# Patient Record
(demographics unavailable — no encounter records)

---

## 2024-12-23 NOTE — REVIEW OF SYSTEMS
[Vision Problems] : vision problems [Incontinence] : incontinence [Joint Pain] : joint pain [Unsteady Walk] : ataxia [Memory Loss] : memory loss [Negative] : Heme/Lymph [Discharge] : no discharge [Pain] : no pain [Redness] : no redness [Dryness] : no dryness [Itching] : no itching [Abdominal Pain] : no abdominal pain [Nausea] : no nausea [Constipation] : no constipation [Diarrhea] : no diarrhea [Vomiting] : no vomiting [Heartburn] : no heartburn [Melena] : no melena [Dysuria] : no dysuria [Hesitancy] : no hesitancy [Nocturia] : no nocturia [Hematuria] : no hematuria [Frequency] : no frequency [Impotence] : no impotency [Poor Libido] : libido not poor [Joint Stiffness] : no joint stiffness [Muscle Pain] : no muscle pain [Muscle Weakness] : no muscle weakness [Back Pain] : no back pain [Joint Swelling] : no joint swelling [Headache] : no headache [Fainting] : no fainting [Confusion] : no confusion [FreeTextEntry7] : poor appetite [FreeTextEntry8] : stress incontinent  [de-identified] : history of dizziness

## 2024-12-23 NOTE — PHYSICAL EXAM
[No Acute Distress] : no acute distress [Normal Voice/Communication] : normal voice communication [Normal Sclera/Conjunctiva] : normal sclera/conjunctiva [Normal Outer Ear/Nose] : the ears and nose were normal in appearance [No JVD] : no jugular venous distention [No Respiratory Distress] : no respiratory distress [Normal Rate] : heart rate was normal  [Regular Rhythm] : with a regular rhythm [Normal S1, S2] : normal S1 and S2 [No Murmurs] : no murmurs heard [No Edema] : there was no peripheral edema [Normal Bowel Sounds] : normal bowel sounds [Non Tender] : non-tender [Soft] : abdomen soft [Not Distended] : not distended [Normal Post Cervical Nodes] : no posterior cervical lymphadenopathy [Normal Anterior Cervical Nodes] : no anterior cervical lymphadenopathy [No Joint Swelling] : no joint swelling seen [No Rash] : no rash [Normal Reflexes] : deep tendon reflexes were 2+ and symmetric [Oriented x3] : oriented to person, place, and time [Over the Past 2 Weeks, Have You Felt Down, Depressed, or Hopeless?] : 1.) Over the past 2 weeks, have you felt down, depressed, or hopeless? No [Over the Past 2 Weeks, Have You Felt Little Interest or Pleasure Doing Things?] : 2.) Over the past 2 weeks, have you felt little interest or pleasure doing things? No [Foot Ulcers] : no foot ulcers [de-identified] : unsteady gait,

## 2024-12-23 NOTE — PHYSICAL EXAM
[No Acute Distress] : no acute distress [Normal Voice/Communication] : normal voice communication [Normal Sclera/Conjunctiva] : normal sclera/conjunctiva [Normal Outer Ear/Nose] : the ears and nose were normal in appearance [No JVD] : no jugular venous distention [No Respiratory Distress] : no respiratory distress [Normal Rate] : heart rate was normal  [Regular Rhythm] : with a regular rhythm [Normal S1, S2] : normal S1 and S2 [No Murmurs] : no murmurs heard [No Edema] : there was no peripheral edema [Normal Bowel Sounds] : normal bowel sounds [Non Tender] : non-tender [Soft] : abdomen soft [Not Distended] : not distended [Normal Post Cervical Nodes] : no posterior cervical lymphadenopathy [Normal Anterior Cervical Nodes] : no anterior cervical lymphadenopathy [No Joint Swelling] : no joint swelling seen [No Rash] : no rash [Normal Reflexes] : deep tendon reflexes were 2+ and symmetric [Oriented x3] : oriented to person, place, and time [Over the Past 2 Weeks, Have You Felt Down, Depressed, or Hopeless?] : 1.) Over the past 2 weeks, have you felt down, depressed, or hopeless? No [Over the Past 2 Weeks, Have You Felt Little Interest or Pleasure Doing Things?] : 2.) Over the past 2 weeks, have you felt little interest or pleasure doing things? No [Foot Ulcers] : no foot ulcers [de-identified] : unsteady gait,

## 2024-12-23 NOTE — COUNSELING
[Normal Weight - ( BMI  <25 )] : normal weight - ( BMI  <25 ) [Sodium restriction 2gm recommended] : sodium restriction 2 gm recommended [Non - Smoker] : non-smoker [Smoke/CO Detectors] : smoke/CO detectors [Use grab bars] : use grab bars [Use assistive device to avoid falls] : use assistive device to avoid falls [Remove clutter and unsafe carpeting to avoid falls] : remove clutter and unsafe carpeting to avoid falls [] : diabetic screening [Patient not on disease-modifying anti-rheumatic drug due to overall prognosis] : Patient not on disease-modifying anti-rheumatic drug due to overall prognosis [Not Recommended] : Aspirin use not recommended due to overall prognosis [Minimize unnecessary interventions] : minimize unnecessary interventions [Maintain functional ability] : maintain functional ability [Discussed disease trajectory with patient/caregiver] : discussed disease trajectory with patient/caregiver [Patient/Caregiver not ready to engage in discussion] : patient/caregiver not ready to engage in discussion [Annual Discussion and review of: ___] : Annual discussion and review of [unfilled] [Full Code] : Code Status: Full Code [No Limitations] : Treatment Guidelines: No limitations [Long Term Intubation] : Intubation: Long term intubation [_____] : HCP: [unfilled] [ - New patient with 2 or more chronic conditions; CCM discussed and patient-centered care plan established] : New patient with 2 or more chronic conditions; CCM discussed and patient-centered care plan established [de-identified] : Daughter want DNR/DNi but will like to discuss with sisters

## 2024-12-23 NOTE — CHRONIC CARE ASSESSMENT
[Limited decision making ability] : limited decision making ability [___ Times Per Week] : exercises [unfilled] times per week [Walking] : walking [Low Fat Diet] : low fat [Low Carb Diet] : low carbohydrate [Low Salt Diet] : low salt [General Adherence] : and is generally adherent [PPS Score: ____] : Palliative Performance Scale (PPS) Score: [unfilled] [FAST Score: ____] : Functional Assessment Scale (FAST) Score: [unfilled] [de-identified] : Prostate cancer(2018) with s/p Radiation therapy,  brain hemorrhage(1/4/22), Craniotomy(1/5/22), early onset Alzheimer's(2018), memory loss, seizure disorder, unsteady gait, peripheral neuropathy,  headache, dizziness, [de-identified] : walks only at home, unsteady gait [FreeTextEntry9] : only go outside for appointment [de-identified] : low fat, low sodium

## 2024-12-23 NOTE — CHRONIC CARE ASSESSMENT
[Limited decision making ability] : limited decision making ability [___ Times Per Week] : exercises [unfilled] times per week [Walking] : walking [Low Fat Diet] : low fat [Low Carb Diet] : low carbohydrate [Low Salt Diet] : low salt [General Adherence] : and is generally adherent [PPS Score: ____] : Palliative Performance Scale (PPS) Score: [unfilled] [FAST Score: ____] : Functional Assessment Scale (FAST) Score: [unfilled] [de-identified] : Prostate cancer(2018) with s/p Radiation therapy,  brain hemorrhage(1/4/22), Craniotomy(1/5/22), early onset Alzheimer's(2018), memory loss, seizure disorder, unsteady gait, peripheral neuropathy,  headache, dizziness, [de-identified] : walks only at home, unsteady gait [FreeTextEntry9] : only go outside for appointment [de-identified] : low fat, low sodium

## 2024-12-23 NOTE — REVIEW OF SYSTEMS
[Vision Problems] : vision problems [Incontinence] : incontinence [Joint Pain] : joint pain [Unsteady Walk] : ataxia [Memory Loss] : memory loss [Negative] : Heme/Lymph [Discharge] : no discharge [Pain] : no pain [Redness] : no redness [Dryness] : no dryness [Itching] : no itching [Abdominal Pain] : no abdominal pain [Nausea] : no nausea [Constipation] : no constipation [Diarrhea] : no diarrhea [Vomiting] : no vomiting [Heartburn] : no heartburn [Melena] : no melena [Dysuria] : no dysuria [Hesitancy] : no hesitancy [Nocturia] : no nocturia [Hematuria] : no hematuria [Frequency] : no frequency [Impotence] : no impotency [Poor Libido] : libido not poor [Joint Stiffness] : no joint stiffness [Muscle Pain] : no muscle pain [Muscle Weakness] : no muscle weakness [Back Pain] : no back pain [Joint Swelling] : no joint swelling [Headache] : no headache [Fainting] : no fainting [Confusion] : no confusion [FreeTextEntry7] : poor appetite [FreeTextEntry8] : stress incontinent  [de-identified] : history of dizziness

## 2024-12-23 NOTE — COUNSELING
[Normal Weight - ( BMI  <25 )] : normal weight - ( BMI  <25 ) [Sodium restriction 2gm recommended] : sodium restriction 2 gm recommended [Non - Smoker] : non-smoker [Smoke/CO Detectors] : smoke/CO detectors [Use grab bars] : use grab bars [Use assistive device to avoid falls] : use assistive device to avoid falls [Remove clutter and unsafe carpeting to avoid falls] : remove clutter and unsafe carpeting to avoid falls [] : diabetic screening [Patient not on disease-modifying anti-rheumatic drug due to overall prognosis] : Patient not on disease-modifying anti-rheumatic drug due to overall prognosis [Not Recommended] : Aspirin use not recommended due to overall prognosis [Minimize unnecessary interventions] : minimize unnecessary interventions [Maintain functional ability] : maintain functional ability [Discussed disease trajectory with patient/caregiver] : discussed disease trajectory with patient/caregiver [Patient/Caregiver not ready to engage in discussion] : patient/caregiver not ready to engage in discussion [Annual Discussion and review of: ___] : Annual discussion and review of [unfilled] [Full Code] : Code Status: Full Code [No Limitations] : Treatment Guidelines: No limitations [Long Term Intubation] : Intubation: Long term intubation [_____] : HCP: [unfilled] [ - New patient with 2 or more chronic conditions; CCM discussed and patient-centered care plan established] : New patient with 2 or more chronic conditions; CCM discussed and patient-centered care plan established [de-identified] : Daughter want DNR/DNi but will like to discuss with sisters

## 2024-12-23 NOTE — HISTORY OF PRESENT ILLNESS
[In-Place] : has aide services in-place [Patient is stable] : patient is stable [Education provided] : education provided [Patient] : patient [Family Member] : family member [FreeTextEntry4] : urology, cardiology, neurology, podiatrist  [FreeTextEntry1] : Unsteady gait, Alzheimer's disease, memory loss [FreeTextEntry2] : 12/20/2024 COVID SCREEN: Patient or caretaker denies fever, cough, trouble breathing, rash, vomiting. Patient has not been in close contact with anyone who is COVID-19 positive, or suspected of having COVID-19.  N95 mask, gloves, eye wear and gown (if indicated) used during visit: Yes.  Total face to face time with patient is 70 min.  Fredy Ferreira is an 82M with a PMHx of Prostate cancer(2018) with s/p Radiation therapy, HTN, HLD, brain hemorrhage(1/4/22), Craniotomy(1/5/22), early onset Alzheimer's(2018), memory loss, seizure disorder, cataract surgery, unsteady gait, peripheral neuropathy, GERD, headache, dizziness, seen today for initial home visit  Accompanied patient for the visit was spouse, daughter and HHA  Visit done with daughter on the phone.  Hospitalizations: August 2024: ER visit as HHA suspected seizure like activity. F/u with Neurologist revealed EEG to be unremarkable.   Today's Visit and Review - 12/20/2024 - Daughter on phone for visit from start of visit but was inpatient at home few minutes later. Daughter expresses dissatisfaction with current PCP. States the provider did not inform the family of the pt's prostate cancer. - Daughter reports recent labs show PSA 8. - Ambulates at home with walker, uses wheelchair outside of home. - Reports neuropathy pain in hands and feet managed by Gabapentin. - R-sided weakness and possible visual impairment attributed to past brain injury. - Difficulty with fluid intake, prefers coconut water over plain water. - c/o headaches and occasional dizziness. - Most recent fall 2022 - Request for Flu shot  - Receiving 24-hour care from two [facility]"" (one [facility] 3 days/week, other 4 days/week).   Geriatric Assessment: -Appetite/weight: decreased, worse at dinner -Gait/falls: No recent falls, ambulates with walker around home -Pain: neuropathy and controlled on gabapentin -Sleep: sleeps well -BMs: Regular, no constipation/diarrhea; continent. -Urine: normal, no pain, blood, or odor; stress incontinent -Skin: Intact -DME: walker, wheelchair -Mood/memory: memory loss Communication: normal  PSHx: Cataract b/l Craniotomy (2022)  Social Hx: Former smoker (stopped in 1980's) Wife still alive 3 female children retired  Family Hx: HTN: Mother Seizure-Brother  Specialists: Urology, neurology, cardiology  Allergies: Lactose intolerant Advised against aspirin due to PMHx of brain hemorrhage   Patient/ patient's caregiver reports no weight loss >10 lbs in the past 6 months. No changes in dentition or swallowing reported, No changes in hearing or vision reported. No changes in Cognition reported but with early unset Alzheimer's and memory loss. Patient denies any symptoms of depression or anxiety. Patient is ADL independent/dependent and IADL dependent. No changes in gait or falls reported.  Patient's home environment is safe.   Goals of care discussed. MOLST not completed.  . .

## 2024-12-23 NOTE — PHYSICAL EXAM
[No Acute Distress] : no acute distress [Normal Voice/Communication] : normal voice communication [Normal Sclera/Conjunctiva] : normal sclera/conjunctiva [Normal Outer Ear/Nose] : the ears and nose were normal in appearance [No JVD] : no jugular venous distention [No Respiratory Distress] : no respiratory distress [Normal Rate] : heart rate was normal  [Regular Rhythm] : with a regular rhythm [Normal S1, S2] : normal S1 and S2 [No Murmurs] : no murmurs heard [No Edema] : there was no peripheral edema [Normal Bowel Sounds] : normal bowel sounds [Non Tender] : non-tender [Soft] : abdomen soft [Not Distended] : not distended [Normal Post Cervical Nodes] : no posterior cervical lymphadenopathy [Normal Anterior Cervical Nodes] : no anterior cervical lymphadenopathy [No Joint Swelling] : no joint swelling seen [No Rash] : no rash [Normal Reflexes] : deep tendon reflexes were 2+ and symmetric [Oriented x3] : oriented to person, place, and time [Over the Past 2 Weeks, Have You Felt Down, Depressed, or Hopeless?] : 1.) Over the past 2 weeks, have you felt down, depressed, or hopeless? No [Over the Past 2 Weeks, Have You Felt Little Interest or Pleasure Doing Things?] : 2.) Over the past 2 weeks, have you felt little interest or pleasure doing things? No [Foot Ulcers] : no foot ulcers [de-identified] : unsteady gait,

## 2024-12-23 NOTE — HEALTH RISK ASSESSMENT
[HRA Reviewed] : Health risk assessment reviewed [Independent] : feeding [Some assistance needed] : managing finances [Full assistance needed] : doing laundry [No falls in past year] : Patient reported no falls in the past year [Yes] : The patient does have visual impairment [TimeGetUpGo] : 6 [de-identified] : unsteady gait and use walker at home

## 2024-12-23 NOTE — REASON FOR VISIT
[Initial Evaluation] : an initial evaluation [Spouse] : spouse [Family Member] : family member [Formal Caregiver] : formal caregiver [Other: ______] : provided by RAY [Time Spent: ____ minutes] : Total time spent using  services: [unfilled] minutes. The patient's primary language is not English thus required  services. [Interpreters_FullName] : Yessenia Garrison [Interpreters_Relationshiptopatient] : Daughter [FreeTextEntry3] : neurology, urology, cardiology [TWNoteComboBox1] : Austrian

## 2024-12-23 NOTE — HISTORY OF PRESENT ILLNESS
[In-Place] : has aide services in-place [Patient is stable] : patient is stable [Education provided] : education provided [Patient] : patient [Family Member] : family member [FreeTextEntry4] : urology, cardiology, neurology, podiatrist  [FreeTextEntry1] : Unsteady gait, Alzheimer's disease, memory loss [FreeTextEntry2] : 12/20/2024 COVID SCREEN: Patient or caretaker denies fever, cough, trouble breathing, rash, vomiting. Patient has not been in close contact with anyone who is COVID-19 positive, or suspected of having COVID-19.  N95 mask, gloves, eye wear and gown (if indicated) used during visit: Yes.  Total face to face time with patient is 70 min.  Fredy Ferreira is an 82M with a PMHx of Prostate cancer(2018) with s/p Radiation therapy, HTN, HLD, brain hemorrhage(1/4/22), Craniotomy(1/5/22), early onset Alzheimer's(2018), memory loss, seizure disorder, cataract surgery, unsteady gait, peripheral neuropathy, GERD, headache, dizziness, seen today for initial home visit  Accompanied patient for the visit was spouse, daughter and HHA  Visit done with daughter on the phone.  Hospitalizations: August 2024: ER visit as HHA suspected seizure like activity. F/u with Neurologist revealed EEG to be unremarkable.   Today's Visit and Review - 12/20/2024 - Daughter on phone for visit from start of visit but was inpatient at home few minutes later. Daughter expresses dissatisfaction with current PCP. States the provider did not inform the family of the pt's prostate cancer. - Daughter reports recent labs show PSA 8. - Ambulates at home with walker, uses wheelchair outside of home. - Reports neuropathy pain in hands and feet managed by Gabapentin. - R-sided weakness and possible visual impairment attributed to past brain injury. - Difficulty with fluid intake, prefers coconut water over plain water. - c/o headaches and occasional dizziness. - Most recent fall 2022 - Request for Flu shot  - Receiving 24-hour care from two [facility] (one [facility] 3 days/week, other 4 days/week).   Geriatric Assessment: -Appetite/weight: decreased, worse at dinner -Gait/falls: No recent falls, ambulates with walker around home -Pain: neuropathy and controlled on gabapentin -Sleep: sleeps well -BMs: Regular, no constipation/diarrhea; continent. -Urine: normal, no pain, blood, or odor; stress incontinent -Skin: Intact -DME: walker, wheelchair -Mood/memory: memory loss Communication: normal  PSHx: Cataract b/l Craniotomy (2022)  Social Hx: Former smoker (stopped in 1980's) Wife still alive 3 female children retired  Family Hx: HTN: Mother Seizure-Brother  Specialists: Urology, neurology, cardiology  Allergies: Lactose intolerant Advised against aspirin due to PMHx of brain hemorrhage   Patient/ patient's caregiver reports no weight loss >10 lbs in the past 6 months. No changes in dentition or swallowing reported, No changes in hearing or vision reported. No changes in Cognition reported but with early unset Alzheimer's and memory loss. Patient denies any symptoms of depression or anxiety. Patient is ADL independent/dependent and IADL dependent. No changes in gait or falls reported.  Patient's home environment is safe.   Goals of care discussed. MOLST not completed.  . .

## 2024-12-23 NOTE — HEALTH RISK ASSESSMENT
[HRA Reviewed] : Health risk assessment reviewed [Independent] : feeding [Some assistance needed] : managing finances [Full assistance needed] : doing laundry [No falls in past year] : Patient reported no falls in the past year [Yes] : The patient does have visual impairment [TimeGetUpGo] : 6 [de-identified] : unsteady gait and use walker at home

## 2024-12-23 NOTE — REASON FOR VISIT
[Initial Evaluation] : an initial evaluation [Spouse] : spouse [Family Member] : family member [Formal Caregiver] : formal caregiver [Other: ______] : provided by RAY [Time Spent: ____ minutes] : Total time spent using  services: [unfilled] minutes. The patient's primary language is not English thus required  services. [Interpreters_FullName] : Yessenia Garrison [Interpreters_Relationshiptopatient] : Daughter [FreeTextEntry3] : neurology, urology, cardiology [TWNoteComboBox1] : Guyanese

## 2024-12-23 NOTE — COUNSELING
[Normal Weight - ( BMI  <25 )] : normal weight - ( BMI  <25 ) [Sodium restriction 2gm recommended] : sodium restriction 2 gm recommended [Non - Smoker] : non-smoker [Smoke/CO Detectors] : smoke/CO detectors [Use grab bars] : use grab bars [Use assistive device to avoid falls] : use assistive device to avoid falls [Remove clutter and unsafe carpeting to avoid falls] : remove clutter and unsafe carpeting to avoid falls [] : diabetic screening [Patient not on disease-modifying anti-rheumatic drug due to overall prognosis] : Patient not on disease-modifying anti-rheumatic drug due to overall prognosis [Not Recommended] : Aspirin use not recommended due to overall prognosis [Minimize unnecessary interventions] : minimize unnecessary interventions [Maintain functional ability] : maintain functional ability [Discussed disease trajectory with patient/caregiver] : discussed disease trajectory with patient/caregiver [Patient/Caregiver not ready to engage in discussion] : patient/caregiver not ready to engage in discussion [Annual Discussion and review of: ___] : Annual discussion and review of [unfilled] [Full Code] : Code Status: Full Code [No Limitations] : Treatment Guidelines: No limitations [Long Term Intubation] : Intubation: Long term intubation [_____] : HCP: [unfilled] [ - New patient with 2 or more chronic conditions; CCM discussed and patient-centered care plan established] : New patient with 2 or more chronic conditions; CCM discussed and patient-centered care plan established [de-identified] : Daughter want DNR/DNi but will like to discuss with sisters

## 2024-12-23 NOTE — REVIEW OF SYSTEMS
TRANSFER - IN REPORT:    Verbal report received from [address], RN(name) on [facility]   being received from CCU (unit) for routine progression of care      Report consisted of patients Situation, Background, Assessment and   Recommendations(SBAR). Information from the following report(s) SBAR, Kardex, Intake/Output and MAR was reviewed with the receiving nurse. Opportunity for questions and clarification was provided. Assessment completed upon patients arrival to unit and care assumed. [Vision Problems] : vision problems [Incontinence] : incontinence [Joint Pain] : joint pain [Unsteady Walk] : ataxia [Memory Loss] : memory loss [Negative] : Heme/Lymph [Discharge] : no discharge [Pain] : no pain [Redness] : no redness [Dryness] : no dryness [Itching] : no itching [Abdominal Pain] : no abdominal pain [Nausea] : no nausea [Constipation] : no constipation [Diarrhea] : no diarrhea [Vomiting] : no vomiting [Heartburn] : no heartburn [Melena] : no melena [Dysuria] : no dysuria [Hesitancy] : no hesitancy [Nocturia] : no nocturia [Hematuria] : no hematuria [Frequency] : no frequency [Impotence] : no impotency [Poor Libido] : libido not poor [Joint Stiffness] : no joint stiffness [Muscle Pain] : no muscle pain [Muscle Weakness] : no muscle weakness [Back Pain] : no back pain [Joint Swelling] : no joint swelling [Headache] : no headache [Fainting] : no fainting [Confusion] : no confusion [FreeTextEntry7] : poor appetite [FreeTextEntry8] : stress incontinent  [de-identified] : history of dizziness

## 2024-12-23 NOTE — ADDENDUM
[FreeTextEntry1] : Documented by Ethel Moreno acting as a scribe under Lexis Thornton NP. 12/20/2024

## 2024-12-23 NOTE — HISTORY OF PRESENT ILLNESS
[In-Place] : has aide services in-place [Patient is stable] : patient is stable [Education provided] : education provided [Patient] : patient [Family Member] : family member [FreeTextEntry4] : urology, cardiology, neurology, podiatrist  [FreeTextEntry1] : Unsteady gait, Alzheimer's disease, memory loss [FreeTextEntry2] : 12/20/2024 COVID SCREEN: Patient or caretaker denies fever, cough, trouble breathing, rash, vomiting. Patient has not been in close contact with anyone who is COVID-19 positive, or suspected of having COVID-19.  N95 mask, gloves, eye wear and gown (if indicated) used during visit: Yes.  Total face to face time with patient is 70 min.  Fredy Ferreira is an 82M with a PMHx of Prostate cancer(2018) with s/p Radiation therapy, HTN, HLD, brain hemorrhage(1/4/22), Craniotomy(1/5/22), early onset Alzheimer's(2018), memory loss, seizure disorder, cataract surgery, unsteady gait, peripheral neuropathy, GERD, headache, dizziness, seen today for initial home visit  Accompanied patient for the visit was spouse, daughter and HHA  Visit done with daughter on the phone.  Hospitalizations: August 2024: ER visit as HHA suspected seizure like activity. F/u with Neurologist revealed EEG to be unremarkable.   Today's Visit and Review - 12/20/2024 - Daughter on phone for visit from start of visit but was inpatient at home few minutes later. Daughter expresses dissatisfaction with current PCP. States the provider did not inform the family of the pt's prostate cancer. - Daughter reports recent labs show PSA 8. - Ambulates at home with walker, uses wheelchair outside of home. - Reports neuropathy pain in hands and feet managed by Gabapentin. - R-sided weakness and possible visual impairment attributed to past brain injury. - Difficulty with fluid intake, prefers coconut water over plain water. - c/o headaches and occasional dizziness. - Most recent fall 2022 - Request for Flu shot  - Receiving 24-hour care from two [facility] (one [facility] 3 days/week, other 4 days/week).   Geriatric Assessment: -Appetite/weight: decreased, worse at dinner -Gait/falls: No recent falls, ambulates with walker around home -Pain: neuropathy and controlled on gabapentin -Sleep: sleeps well -BMs: Regular, no constipation/diarrhea; continent. -Urine: normal, no pain, blood, or odor; stress incontinent -Skin: Intact -DME: walker, wheelchair -Mood/memory: memory loss Communication: normal  PSHx: Cataract b/l Craniotomy (2022)  Social Hx: Former smoker (stopped in 1980's) Wife still alive 3 female children retired  Family Hx: HTN: Mother Seizure-Brother  Specialists: Urology, neurology, cardiology  Allergies: Lactose intolerant Advised against aspirin due to PMHx of brain hemorrhage   Patient/ patient's caregiver reports no weight loss >10 lbs in the past 6 months. No changes in dentition or swallowing reported, No changes in hearing or vision reported. No changes in Cognition reported but with early unset Alzheimer's and memory loss. Patient denies any symptoms of depression or anxiety. Patient is ADL independent/dependent and IADL dependent. No changes in gait or falls reported.  Patient's home environment is safe.   Goals of care discussed. MOLST not completed.  . .

## 2024-12-23 NOTE — HEALTH RISK ASSESSMENT
[HRA Reviewed] : Health risk assessment reviewed [Independent] : feeding [Some assistance needed] : managing finances [Full assistance needed] : doing laundry [No falls in past year] : Patient reported no falls in the past year [Yes] : The patient does have visual impairment [TimeGetUpGo] : 6 [de-identified] : unsteady gait and use walker at home

## 2024-12-23 NOTE — REASON FOR VISIT
[Initial Evaluation] : an initial evaluation [Spouse] : spouse [Family Member] : family member [Formal Caregiver] : formal caregiver [Other: ______] : provided by RAY [Time Spent: ____ minutes] : Total time spent using  services: [unfilled] minutes. The patient's primary language is not English thus required  services. [Interpreters_FullName] : Yessenia Garrison [Interpreters_Relationshiptopatient] : Daughter [FreeTextEntry3] : neurology, urology, cardiology [TWNoteComboBox1] : Iranian

## 2024-12-23 NOTE — CHRONIC CARE ASSESSMENT
[Limited decision making ability] : limited decision making ability [___ Times Per Week] : exercises [unfilled] times per week [Walking] : walking [Low Fat Diet] : low fat [Low Carb Diet] : low carbohydrate [Low Salt Diet] : low salt [General Adherence] : and is generally adherent [PPS Score: ____] : Palliative Performance Scale (PPS) Score: [unfilled] [FAST Score: ____] : Functional Assessment Scale (FAST) Score: [unfilled] [de-identified] : Prostate cancer(2018) with s/p Radiation therapy,  brain hemorrhage(1/4/22), Craniotomy(1/5/22), early onset Alzheimer's(2018), memory loss, seizure disorder, unsteady gait, peripheral neuropathy,  headache, dizziness, [FreeTextEntry9] : only go outside for appointment [de-identified] : walks only at home, unsteady gait [de-identified] : low fat, low sodium

## 2024-12-23 NOTE — END OF VISIT
[FreeTextEntry3] : Documented by Ethel Moreno acting as a scribe for Lexis Thornton NP. 12/20/2024   All medical record entries made by the Scribe were at my direction and personally dictated by me on 12/20/2024. I have reviewed the chart and agree that the record accurately reflects my personal performance of the history, physical exam, assessment and plan. I have also personally directed, reviewed, and agreed with the chart.

## 2025-03-06 NOTE — REASON FOR VISIT
[Initial Evaluation] : an initial evaluation [Other: ______] : provided by RAY [Follow-Up] : a follow-up visit [Spouse] : spouse [Family Member] : family member [Formal Caregiver] : formal caregiver [Other: _____] : [unfilled] [Time Spent: ____ minutes] : Total time spent using  services: [unfilled] minutes. The patient's primary language is not English thus required  services. [Pre-Visit Preparation] : pre-visit preparation was done [Intercurrent Specialty/Sub-specialty Visits] : the patient has intercurrent specialty/sub-specialty visits [Interpreters_FullName] : Yessenia Garrison [Interpreters_Relationshiptopatient] : Daughter [FreeTextEntry2] : chart reviewed [FreeTextEntry3] : neurology, urology, cardiology [TWNoteComboBox1] : Mexican

## 2025-03-06 NOTE — ADDENDUM
[FreeTextEntry1] :   Documented by Brian Jackson acting as a scribe for Lexis Thornton NP. 03/06/2025

## 2025-03-06 NOTE — COUNSELING
[] : diabetic screening [Not Recommended] : Aspirin use not recommended due to overall prognosis [Discussed disease trajectory with patient/caregiver] : discussed disease trajectory with patient/caregiver [Patient/Caregiver not ready to engage in discussion] : patient/caregiver not ready to engage in discussion [Full Code] : Code Status: Full Code [No Limitations] : Treatment Guidelines: No limitations [Long Term Intubation] : Intubation: Long term intubation [Normal Weight - ( BMI  <25 )] : normal weight - ( BMI  <25 ) [Sodium restriction 2gm recommended] : sodium restriction 2 gm recommended [Non - Smoker] : non-smoker [Smoke/CO Detectors] : smoke/CO detectors [Use grab bars] : use grab bars [Use assistive device to avoid falls] : use assistive device to avoid falls [Remove clutter and unsafe carpeting to avoid falls] : remove clutter and unsafe carpeting to avoid falls [Patient not on disease-modifying anti-rheumatic drug due to overall prognosis] : Patient not on disease-modifying anti-rheumatic drug due to overall prognosis [Minimize unnecessary interventions] : minimize unnecessary interventions [Maintain functional ability] : maintain functional ability [Advanced Directives discussed: ____] : Advanced directives discussed: [unfilled] [Annual Discussion and review of: ___] : Annual discussion and review of [unfilled] [_____] : HCP: [unfilled] [de-identified] : Daughter want DNR/DNi but will like to discuss with sisters

## 2025-03-06 NOTE — REVIEW OF SYSTEMS
[Vision Problems] : vision problems [Incontinence] : incontinence [Joint Pain] : joint pain [Joint Stiffness] : joint stiffness [Back Pain] : back pain [Confusion] : confusion [Unsteady Walk] : ataxia [Memory Loss] : memory loss [Negative] : Heme/Lymph [Discharge] : no discharge [Pain] : no pain [Redness] : no redness [Dryness] : no dryness [Itching] : no itching [Abdominal Pain] : no abdominal pain [Nausea] : no nausea [Constipation] : no constipation [Diarrhea] : no diarrhea [Vomiting] : no vomiting [Heartburn] : no heartburn [Melena] : no melena [Dysuria] : no dysuria [Hesitancy] : no hesitancy [Nocturia] : no nocturia [Hematuria] : no hematuria [Frequency] : no frequency [Impotence] : no impotency [Poor Libido] : libido not poor [Muscle Pain] : no muscle pain [Muscle Weakness] : no muscle weakness [Joint Swelling] : no joint swelling [Headache] : no headache [Dizziness] : no dizziness [Fainting] : no fainting [FreeTextEntry3] : right eye poor vision  [FreeTextEntry7] : decrease  appetite [FreeTextEntry8] : stress incontinent  [de-identified] : s

## 2025-03-06 NOTE — REASON FOR VISIT
[Initial Evaluation] : an initial evaluation [Other: ______] : provided by RAY [Follow-Up] : a follow-up visit [Spouse] : spouse [Family Member] : family member [Formal Caregiver] : formal caregiver [Other: _____] : [unfilled] [Time Spent: ____ minutes] : Total time spent using  services: [unfilled] minutes. The patient's primary language is not English thus required  services. [Pre-Visit Preparation] : pre-visit preparation was done [Intercurrent Specialty/Sub-specialty Visits] : the patient has intercurrent specialty/sub-specialty visits [Interpreters_FullName] : Yessenia Garrison [Interpreters_Relationshiptopatient] : Daughter [FreeTextEntry2] : chart reviewed [FreeTextEntry3] : neurology, urology, cardiology [TWNoteComboBox1] : Iraqi

## 2025-03-06 NOTE — REVIEW OF SYSTEMS
[Vision Problems] : vision problems [Incontinence] : incontinence [Joint Pain] : joint pain [Joint Stiffness] : joint stiffness [Back Pain] : back pain [Confusion] : confusion [Unsteady Walk] : ataxia [Memory Loss] : memory loss [Negative] : Heme/Lymph [Discharge] : no discharge [Pain] : no pain [Redness] : no redness [Dryness] : no dryness [Itching] : no itching [Abdominal Pain] : no abdominal pain [Nausea] : no nausea [Constipation] : no constipation [Diarrhea] : no diarrhea [Vomiting] : no vomiting [Heartburn] : no heartburn [Melena] : no melena [Dysuria] : no dysuria [Hesitancy] : no hesitancy [Nocturia] : no nocturia [Hematuria] : no hematuria [Frequency] : no frequency [Impotence] : no impotency [Poor Libido] : libido not poor [Muscle Pain] : no muscle pain [Muscle Weakness] : no muscle weakness [Joint Swelling] : no joint swelling [Headache] : no headache [Dizziness] : no dizziness [Fainting] : no fainting [FreeTextEntry3] : right eye poor vision  [FreeTextEntry7] : decrease  appetite [FreeTextEntry8] : stress incontinent  [de-identified] : s

## 2025-03-06 NOTE — PHYSICAL EXAM
[No Rash] : no rash [Normal Reflexes] : deep tendon reflexes were 2+ and symmetric [No Acute Distress] : no acute distress [Normal Voice/Communication] : normal voice communication [Normal Sclera/Conjunctiva] : normal sclera/conjunctiva [Normal Outer Ear/Nose] : the ears and nose were normal in appearance [Normal TMs] : both tympanic membranes were normal [No JVD] : no jugular venous distention [No Respiratory Distress] : no respiratory distress [Normal Rate] : heart rate was normal  [Regular Rhythm] : with a regular rhythm [Normal S1, S2] : normal S1 and S2 [No Murmurs] : no murmurs heard [No Edema] : there was no peripheral edema [Normal Bowel Sounds] : normal bowel sounds [Non Tender] : non-tender [Soft] : abdomen soft [Not Distended] : not distended [Normal Post Cervical Nodes] : no posterior cervical lymphadenopathy [Normal Anterior Cervical Nodes] : no anterior cervical lymphadenopathy [No Joint Swelling] : no joint swelling seen [Oriented x3] : oriented to person, place, and time [Over the Past 2 Weeks, Have You Felt Down, Depressed, or Hopeless?] : 1.) Over the past 2 weeks, have you felt down, depressed, or hopeless? No [Over the Past 2 Weeks, Have You Felt Little Interest or Pleasure Doing Things?] : 2.) Over the past 2 weeks, have you felt little interest or pleasure doing things? No [Foot Ulcers] : no foot ulcers [de-identified] : unsteady gait, [de-identified] : right shoulder uneven

## 2025-03-06 NOTE — HEALTH RISK ASSESSMENT
[Independent] : feeding [Some assistance needed] : managing finances [Full assistance needed] : doing laundry [No falls in past year] : Patient reported no falls in the past year [Yes] : The patient does have visual impairment [HRA Reviewed] : Health risk assessment reviewed [TimeGetUpGo] : 9 [de-identified] : unsteady gait and use walker at home

## 2025-03-06 NOTE — PHYSICAL EXAM
[No Rash] : no rash [Normal Reflexes] : deep tendon reflexes were 2+ and symmetric [No Acute Distress] : no acute distress [Normal Voice/Communication] : normal voice communication [Normal Sclera/Conjunctiva] : normal sclera/conjunctiva [Normal Outer Ear/Nose] : the ears and nose were normal in appearance [Normal TMs] : both tympanic membranes were normal [No JVD] : no jugular venous distention [No Respiratory Distress] : no respiratory distress [Normal Rate] : heart rate was normal  [Regular Rhythm] : with a regular rhythm [Normal S1, S2] : normal S1 and S2 [No Murmurs] : no murmurs heard [No Edema] : there was no peripheral edema [Normal Bowel Sounds] : normal bowel sounds [Non Tender] : non-tender [Soft] : abdomen soft [Not Distended] : not distended [Normal Post Cervical Nodes] : no posterior cervical lymphadenopathy [Normal Anterior Cervical Nodes] : no anterior cervical lymphadenopathy [No Joint Swelling] : no joint swelling seen [Oriented x3] : oriented to person, place, and time [Over the Past 2 Weeks, Have You Felt Down, Depressed, or Hopeless?] : 1.) Over the past 2 weeks, have you felt down, depressed, or hopeless? No [Over the Past 2 Weeks, Have You Felt Little Interest or Pleasure Doing Things?] : 2.) Over the past 2 weeks, have you felt little interest or pleasure doing things? No [Foot Ulcers] : no foot ulcers [de-identified] : unsteady gait, [de-identified] : right shoulder uneven

## 2025-03-06 NOTE — HISTORY OF PRESENT ILLNESS
[In-Place] : has aide services in-place [Patient is stable] : patient is stable [Education provided] : education provided [Patient] : patient [Family Member] : family member [FreeTextEntry4] : urology, cardiology, neurology, podiatrist  [FreeTextEntry1] : Unsteady gait, Alzheimer's disease, memory loss [FreeTextEntry2] : 03/06/2025 COVID SCREEN: Patient or caretaker denies fever, cough, trouble breathing, rash, vomiting. Patient has not been in close contact with anyone who is COVID-19 positive, or suspected of having COVID-19.  N95 mask, gloves, eye wear and gown (if indicated) used during visit: Yes.  Total face to face time with patient is 45min.  Fredy Ferreira is an 82M with a PMHx of Prostate cancer(2018) with s/p Radiation therapy, HTN, HLD, brain hemorrhage(1/4/22), Craniotomy(1/5/22), early onset Alzheimer's(2018), memory loss, seizure disorder, cataract surgery, unsteady gait, peripheral neuropathy, GERD, headache, dizziness, seen today for follow up home visit  Accompanied patient for the visit was spouse, daughter and HHA   Hospitalizations: August 2024: ER visit as HHA suspected seizure like activity. F/u with Neurologist revealed EEG to be unremarkable.   Daughter was primary historian  and    Today's Visit and Review - 03/06/2025 - Reports has daily headaches, seeing neurologist this afternoon. - Is in beginning stages of Alzheimer's, frequently remarks that he can feel his memory slipping. Still remembers loved ones. - Discussed metazepam for appetite boost. Patient is receiving multivitamin but daughter ran out last month. - Reports eats oatmeal for breakfast, reports feels full longer since starting eating that. Unable to have Ensure due to severe lactose intolerance. - Reports tends to sit all day and watch TV, reports he has recently started covering his head with a blanket throughout the day. - Requests occasional dizziness in mornings. Revisited risks and side effects of blood pressure medication, as well as proper procedure for giving medication. Advised checking blood pressure, and then only give blood pressure if SBP is over 110. - Admits patient refuses to drink water. Will drink Pedialyte and coconut water. - Reports back pain but admits slouches when sitting on couch. Also reports right shoulder is jutting forward. Orthopedic referral sent for evaluation of shoulder. Will get right shoulder Xray - Reports tends to make mess when eating. Tested vision in right eye which was normal. - Oncology reported abnormal TSH with recent blood work  Geriatric Assessment: -Appetite/weight: overall decreased at dinner time -Gait/falls: No recent falls, ambulates with walker around home -Pain: neuropathy and controlled on gabapentin -Sleep: sleeps well -BMs: Regular, no constipation/diarrhea; continent. -Urine: normal, no pain, blood, or odor; stress incontinent -Skin: Intact -DME: walker, wheelchair -Mood/memory: memory loss, depressed Communication: normal  Patient's caregiver reports no weight loss >10 lbs in the past 6 months. No changes in dentition or swallowing reported, No changes in hearing or vision reported. No changes in Cognition reported but with early unset Alzheimer's and memory loss. Patient denies any symptoms of depression or anxiety. Patient is ADL independent/dependent and IADL dependent.  Patient's home environment is safe.   Goals of care discussed 20. MOLST not completed.

## 2025-03-06 NOTE — HEALTH RISK ASSESSMENT
[Independent] : feeding [Some assistance needed] : managing finances [Full assistance needed] : doing laundry [No falls in past year] : Patient reported no falls in the past year [Yes] : The patient does have visual impairment [HRA Reviewed] : Health risk assessment reviewed [TimeGetUpGo] : 9 [de-identified] : unsteady gait and use walker at home

## 2025-03-06 NOTE — CHRONIC CARE ASSESSMENT
[___ Times Per Week] : exercises [unfilled] times per week [Low Fat Diet] : low fat [General Adherence] : and is generally adherent [Limited decision making ability] : limited decision making ability [Walking] : walking [Low Carb Diet] : low carbohydrate [Low Salt Diet] : low salt [Poor Adherence] : but does not adhere to the diet [Insufficient Protein Foods] : insufficient in protein [PPS Score: ____] : Palliative Performance Scale (PPS) Score: [unfilled] [FAST Score: ____] : Functional Assessment Scale (FAST) Score: [unfilled] [de-identified] : Prostate cancer(2018) with s/p Radiation therapy,  brain hemorrhage(1/4/22), Craniotomy(1/5/22), early onset Alzheimer's(2018), memory loss, seizure disorder, unsteady gait, peripheral neuropathy,  headache, dizziness, [de-identified] : walks only at home, unsteady gait [FreeTextEntry9] : only go outside for appointment [de-identified] : low carb, low sodium

## 2025-03-06 NOTE — CHRONIC CARE ASSESSMENT
[___ Times Per Week] : exercises [unfilled] times per week [Low Fat Diet] : low fat [General Adherence] : and is generally adherent [Limited decision making ability] : limited decision making ability [Walking] : walking [Low Carb Diet] : low carbohydrate [Low Salt Diet] : low salt [Poor Adherence] : but does not adhere to the diet [Insufficient Protein Foods] : insufficient in protein [PPS Score: ____] : Palliative Performance Scale (PPS) Score: [unfilled] [FAST Score: ____] : Functional Assessment Scale (FAST) Score: [unfilled] [de-identified] : Prostate cancer(2018) with s/p Radiation therapy,  brain hemorrhage(1/4/22), Craniotomy(1/5/22), early onset Alzheimer's(2018), memory loss, seizure disorder, unsteady gait, peripheral neuropathy,  headache, dizziness, [de-identified] : walks only at home, unsteady gait [FreeTextEntry9] : only go outside for appointment [de-identified] : low carb, low sodium

## 2025-03-06 NOTE — COUNSELING
[] : diabetic screening [Not Recommended] : Aspirin use not recommended due to overall prognosis [Discussed disease trajectory with patient/caregiver] : discussed disease trajectory with patient/caregiver [Patient/Caregiver not ready to engage in discussion] : patient/caregiver not ready to engage in discussion [Full Code] : Code Status: Full Code [No Limitations] : Treatment Guidelines: No limitations [Long Term Intubation] : Intubation: Long term intubation [Normal Weight - ( BMI  <25 )] : normal weight - ( BMI  <25 ) [Sodium restriction 2gm recommended] : sodium restriction 2 gm recommended [Non - Smoker] : non-smoker [Smoke/CO Detectors] : smoke/CO detectors [Use grab bars] : use grab bars [Use assistive device to avoid falls] : use assistive device to avoid falls [Remove clutter and unsafe carpeting to avoid falls] : remove clutter and unsafe carpeting to avoid falls [Patient not on disease-modifying anti-rheumatic drug due to overall prognosis] : Patient not on disease-modifying anti-rheumatic drug due to overall prognosis [Minimize unnecessary interventions] : minimize unnecessary interventions [Maintain functional ability] : maintain functional ability [Advanced Directives discussed: ____] : Advanced directives discussed: [unfilled] [Annual Discussion and review of: ___] : Annual discussion and review of [unfilled] [_____] : HCP: [unfilled] [de-identified] : Daughter want DNR/DNi but will like to discuss with sisters

## 2025-03-06 NOTE — END OF VISIT
[FreeTextEntry3] :   Documented by Brian Jackson acting as a scribe for Lexis Thornton NP. 03/06/2025   All medical record entries made by the Brian Jackson (Scribe) were at my, Lexis Thornton NP, direction and personally dictated by me on 03/06/2025. I have reviewed the chart and agree that the record accurately reflects my personal performance of the history, physical exam, assessment and plan. I have also personally directed, reviewed, and agreed with the chart.

## 2025-03-17 NOTE — DISCUSSION/SUMMARY
[de-identified] : UNUSUALLY LARGE MASS POSTERIOR SHOULDER -  NONTENDER, NON MOBILE   MRI RIGHT SHOULDER

## 2025-03-17 NOTE — PHYSICAL EXAM
[de-identified] : PHYSICAL EXAM  RIGHT  SHOULDER  NECK EXAM  FROM NONTENDER  SPURLING  RIGHT=NEG, LEFT=NEG  NORMAL POSTURE / SCAPULAR PROTRACTION AROM 140 / 140 / 90 / 30 TENDER: SA REGION / AC JOINT / GH JOINT / BICEPS GROOVE  SPECIAL TESTING : DIOR - POSITIVE  ESTELA - POSITIVE  SPEED TEST - POSITIVE  PEÑA - NEGATIVE  APPREHENSION AND SUPPRESSION - NEGATIVE   RC STRENGTH TESTING  SS:  5/5 SUB 5/5 IS     5/5 BICEPS  5/5  SENSATION  - GROSSLY INTACT   [de-identified] : I ORDERED XRAYS OF SHOULDER TO EVALUATE PAINFUL SYMPTOMS  RIGHT SHOULDER XRAY (2 VIEWS - AP AND OUTLET) -  NO OBVIOUS FRACTURE ,  SEPARATION OR DISLOCATION MODERATE AC JOINT  OSTEOARTHRITIS, TYPE 3B ACROMION CSA=34.7

## 2025-03-17 NOTE — HISTORY OF PRESENT ILLNESS
[de-identified] : PATIENT COMPLAINS OF RIGHT SHOUDLER MASS  LARGE NONTENDER MASS POSTERIOR SHOULDER  12 X 12 CM   WHAT IS YOUR DOMINANT HAND- RIGHT HANDED  CEREBRAL  BEGAN- 2022 PAIN  0 /10 , PRIOR TREATMENT- REST   HAS HAD PREVIOUS IMAGING -  NO HAS HAD PHYSICAL THERAPY WITHOUT RELIEF- NO HAS HAD PREVIOUS SURGERY-  NO HAS HAD PREVIOUS INJECTION .- NO

## 2025-03-26 NOTE — HISTORY OF PRESENT ILLNESS
First attempt to contact patient. Voicemail left to call back.   [In-Place] : has aide services in-place [Patient is stable] : patient is stable [Education provided] : education provided [Patient] : patient [Family Member] : family member [FreeTextEntry4] : urology, cardiology, neurology, podiatrist  [FreeTextEntry1] : Unsteady gait, Alzheimer's disease, memory loss [FreeTextEntry2] : 03/06/2025 COVID SCREEN: Patient or caretaker denies fever, cough, trouble breathing, rash, vomiting. Patient has not been in close contact with anyone who is COVID-19 positive, or suspected of having COVID-19.  N95 mask, gloves, eye wear and gown (if indicated) used during visit: Yes.  Total face to face time with patient is 45min.  Fredy Ferreira is an 82M with a PMHx of Prostate cancer(2018) with s/p Radiation therapy, HTN, HLD, brain hemorrhage(1/4/22), Craniotomy(1/5/22), early onset Alzheimer's(2018), memory loss, seizure disorder, cataract surgery, unsteady gait, peripheral neuropathy, GERD, headache, dizziness, seen today for follow up home visit  Accompanied patient for the visit was spouse, daughter and HHA   Hospitalizations: August 2024: ER visit as HHA suspected seizure like activity. F/u with Neurologist revealed EEG to be unremarkable.   Daughter was primary historian  and    Today's Visit and Review - 03/06/2025 - Reports has daily headaches, seeing neurologist this afternoon. - Is in beginning stages of Alzheimer's, frequently remarks that he can feel his memory slipping. Still remembers loved ones. - Discussed metazepam for appetite boost. Patient is receiving multivitamin but daughter ran out last month. - Reports eats oatmeal for breakfast, reports feels full longer since starting eating that. Unable to have Ensure due to severe lactose intolerance. - Reports tends to sit all day and watch TV, reports he has recently started covering his head with a blanket throughout the day. - Requests occasional dizziness in mornings. Revisited risks and side effects of blood pressure medication, as well as proper procedure for giving medication. Advised checking blood pressure, and then only give blood pressure if SBP is over 110. - Admits patient refuses to drink water. Will drink Pedialyte and coconut water. - Reports back pain but admits slouches when sitting on couch. Also reports right shoulder is jutting forward. Orthopedic referral sent for evaluation of shoulder. Will get right shoulder Xray - Reports tends to make mess when eating. Tested vision in right eye which was normal. - Oncology reported abnormal TSH with recent blood work  Geriatric Assessment: -Appetite/weight: overall decreased at dinner time -Gait/falls: No recent falls, ambulates with walker around home -Pain: neuropathy and controlled on gabapentin -Sleep: sleeps well -BMs: Regular, no constipation/diarrhea; continent. -Urine: normal, no pain, blood, or odor; stress incontinent -Skin: Intact -DME: walker, wheelchair -Mood/memory: memory loss, depressed Communication: normal  Patient's caregiver reports no weight loss >10 lbs in the past 6 months. No changes in dentition or swallowing reported, No changes in hearing or vision reported. No changes in Cognition reported but with early unset Alzheimer's and memory loss. Patient denies any symptoms of depression or anxiety. Patient is ADL independent/dependent and IADL dependent.  Patient's home environment is safe.   Goals of care discussed 20. MOLST not completed.

## 2025-05-23 NOTE — HEALTH RISK ASSESSMENT
[Independent] : feeding [Some assistance needed] : managing finances [Full assistance needed] : doing laundry [No falls in past year] : Patient reported no falls in the past year [Yes] : The patient does have visual impairment [HRA Reviewed] : Health risk assessment reviewed [TimeGetUpGo] : 6 [de-identified] : participated, unsteady gait and use walker at home

## 2025-05-23 NOTE — ADDENDUM
[FreeTextEntry1] : Documented by Ethel Moreno acting as a scribe under Lexis Thornton, EVA. 05/22/2025

## 2025-05-23 NOTE — REVIEW OF SYSTEMS
[de-identified] : s [Vision Problems] : vision problems [Incontinence] : incontinence [Joint Pain] : joint pain [Joint Stiffness] : joint stiffness [Back Pain] : back pain [Confusion] : confusion [Unsteady Walk] : ataxia [Memory Loss] : memory loss [Negative] : Heme/Lymph [Discharge] : no discharge [Pain] : no pain [Redness] : no redness [Dryness] : no dryness [Itching] : no itching [Abdominal Pain] : no abdominal pain [Nausea] : no nausea [Constipation] : no constipation [Diarrhea] : no diarrhea [Vomiting] : no vomiting [Heartburn] : no heartburn [Melena] : no melena [Dysuria] : no dysuria [Hesitancy] : no hesitancy [Nocturia] : no nocturia [Hematuria] : no hematuria [Frequency] : no frequency [Impotence] : no impotency [Poor Libido] : libido not poor [Muscle Pain] : no muscle pain [Muscle Weakness] : no muscle weakness [Joint Swelling] : no joint swelling [Headache] : no headache [Dizziness] : no dizziness [Fainting] : no fainting [FreeTextEntry3] : right eye poor vision  [FreeTextEntry7] : decrease  appetite [FreeTextEntry8] : stress incontinent

## 2025-05-23 NOTE — CHRONIC CARE ASSESSMENT
[___ Times Per Week] : exercises [unfilled] times per week [Poor Adherence] : but does not adhere to the diet [Inadequate social support] : inadequate social support [Limited decision making ability] : limited decision making ability [Walking] : walking [Low Carb Diet] : low carbohydrate [Low Salt Diet] : low salt [Insufficient Protein Foods] : insufficient in protein [PPS Score: ____] : Palliative Performance Scale (PPS) Score: [unfilled] [FAST Score: ____] : Functional Assessment Scale (FAST) Score: [unfilled] [de-identified] : Prostate cancer(2018) with s/p Radiation therapy,  brain hemorrhage(1/4/22), Craniotomy(1/5/22), early onset Alzheimer's(2018), memory loss, seizure disorder, unsteady gait, peripheral neuropathy,  headache, dizziness, [de-identified] : debility, walks only at home, unsteady gait [FreeTextEntry9] : only go outside for appointment [de-identified] : low carb, low sodium, high protein diet

## 2025-05-23 NOTE — HISTORY OF PRESENT ILLNESS
[In-Place] : has aide services in-place [Patient is stable] : patient is stable [Education provided] : education provided [Patient] : patient [Family Member] : family member [FreeTextEntry4] : urology, cardiology, neurology, podiatrist  [FreeTextEntry1] : Unsteady gait, Alzheimer's disease, memory loss [FreeTextEntry2] : 05/22/2025 COVID SCREEN: Patient or caretaker denies fever, cough, trouble breathing, rash, vomiting. Patient has not been in close contact with anyone who is COVID-19 positive, or suspected of having COVID-19.  N95 mask, gloves, eye wear and gown (if indicated) used during visit: Yes.  Total face to face time with patient is 45min.  Fredy Ferreira is an 82M with a PMHx of Prostate cancer(2018) with s/p Radiation therapy, HTN, HLD, brain hemorrhage(1/4/22), Craniotomy(1/5/22), early onset Alzheimer's(2018), memory loss, seizure disorder, cataract surgery, unsteady gait, peripheral neuropathy, GERD, headache, dizziness, seen today for follow up home visit  Accompanied patient for the visit was spouse, HHA , daughter on phone  Daughter was primary historian  and    Today's Visit and Review - 05/22/025 - Seen today for follow up, doing well overall. - Daughter reports patient complaining of headaches. Followed with Neurologist recently. CT and MRI were unremarkable. Daughter believes the patient may not have a headache and just feels the need to say he does. - Advised need for more than one HHA. Will discuss with SW.  Geriatric Assessment: -Appetite/weight: eats well but says he's never hungry. -Gait/falls: No recent falls, ambulates with walker around home -Pain: ?headache, neuropathy pain controlled with gabapentin -Sleep: sleeps well -BMs: Regular, no constipation/diarrhea; continent. -Urine: normal, no pain, blood, or odor; stress incontinent -Skin: Intact -DME: walker, wheelchair, incontinence pants -Mood/memory: memory loss, depressed Communication: normal  Patient's caregiver reports no weight loss >10 lbs in the past 6 months. No changes in dentition or swallowing reported, No changes in hearing or vision reported. No changes in Cognition reported but with early unset Alzheimer's and memory loss. Patient denies any symptoms of depression or anxiety. Patient is ADL independent/dependent and IADL independent/dependent.  Patient's home environment is safe.   Goals of care discussed.

## 2025-05-23 NOTE — PHYSICAL EXAM
[No Acute Distress] : no acute distress [Normal Voice/Communication] : normal voice communication [Normal Sclera/Conjunctiva] : normal sclera/conjunctiva [EOMI] : extra ocular movement intact [Normal Outer Ear/Nose] : the ears and nose were normal in appearance [Normal TMs] : both tympanic membranes were normal [No JVD] : no jugular venous distention [No Respiratory Distress] : no respiratory distress [Normal Rate] : heart rate was normal  [Regular Rhythm] : with a regular rhythm [Normal S1, S2] : normal S1 and S2 [No Murmurs] : no murmurs heard [No Edema] : there was no peripheral edema [Normal Bowel Sounds] : normal bowel sounds [Non Tender] : non-tender [Soft] : abdomen soft [Not Distended] : not distended [Normal Post Cervical Nodes] : no posterior cervical lymphadenopathy [Normal Anterior Cervical Nodes] : no anterior cervical lymphadenopathy [No Joint Swelling] : no joint swelling seen [No Rash] : no rash [Normal Reflexes] : deep tendon reflexes were 2+ and symmetric [Oriented x3] : oriented to person, place, and time [Over the Past 2 Weeks, Have You Felt Down, Depressed, or Hopeless?] : 1.) Over the past 2 weeks, have you felt down, depressed, or hopeless? No [Over the Past 2 Weeks, Have You Felt Little Interest or Pleasure Doing Things?] : 2.) Over the past 2 weeks, have you felt little interest or pleasure doing things? No [Foot Ulcers] : no foot ulcers [de-identified] : unsteady gait, [de-identified] : right shoulder uneven

## 2025-05-23 NOTE — REASON FOR VISIT
[Follow-Up] : a follow-up visit [Spouse] : spouse [Family Member] : family member [Formal Caregiver] : formal caregiver [Other: _____] : [unfilled] [Other: ______] : provided by RAY [Pre-Visit Preparation] : pre-visit preparation was done [Intercurrent Specialty/Sub-specialty Visits] : the patient has intercurrent specialty/sub-specialty visits [Interpreters_Relationshiptopatient] : Daughter [Interpreters_IDNumber] : [id number] [Interpreters_FullName] : Rylee [FreeTextEntry2] : chart reviewed [FreeTextEntry3] : neurology, urology, cardiology [TWNoteComboBox1] : Israeli

## 2025-05-23 NOTE — REASON FOR VISIT
[Follow-Up] : a follow-up visit [Spouse] : spouse [Family Member] : family member [Formal Caregiver] : formal caregiver [Other: _____] : [unfilled] [Other: ______] : provided by RAY [Pre-Visit Preparation] : pre-visit preparation was done [Intercurrent Specialty/Sub-specialty Visits] : the patient has intercurrent specialty/sub-specialty visits [Interpreters_Relationshiptopatient] : Daughter [Interpreters_IDNumber] : [id number] [Interpreters_FullName] : Rylee [FreeTextEntry2] : chart reviewed [FreeTextEntry3] : neurology, urology, cardiology [TWNoteComboBox1] : Ivorian

## 2025-05-23 NOTE — PHYSICAL EXAM
[No Acute Distress] : no acute distress [Normal Voice/Communication] : normal voice communication [Normal Sclera/Conjunctiva] : normal sclera/conjunctiva [EOMI] : extra ocular movement intact [Normal Outer Ear/Nose] : the ears and nose were normal in appearance [Normal TMs] : both tympanic membranes were normal [No JVD] : no jugular venous distention [No Respiratory Distress] : no respiratory distress [Normal Rate] : heart rate was normal  [Regular Rhythm] : with a regular rhythm [Normal S1, S2] : normal S1 and S2 [No Murmurs] : no murmurs heard [No Edema] : there was no peripheral edema [Normal Bowel Sounds] : normal bowel sounds [Non Tender] : non-tender [Soft] : abdomen soft [Not Distended] : not distended [Normal Post Cervical Nodes] : no posterior cervical lymphadenopathy [Normal Anterior Cervical Nodes] : no anterior cervical lymphadenopathy [No Joint Swelling] : no joint swelling seen [No Rash] : no rash [Normal Reflexes] : deep tendon reflexes were 2+ and symmetric [Oriented x3] : oriented to person, place, and time [Over the Past 2 Weeks, Have You Felt Down, Depressed, or Hopeless?] : 1.) Over the past 2 weeks, have you felt down, depressed, or hopeless? No [Over the Past 2 Weeks, Have You Felt Little Interest or Pleasure Doing Things?] : 2.) Over the past 2 weeks, have you felt little interest or pleasure doing things? No [Foot Ulcers] : no foot ulcers [de-identified] : unsteady gait, [de-identified] : right shoulder uneven

## 2025-05-23 NOTE — CHRONIC CARE ASSESSMENT
[___ Times Per Week] : exercises [unfilled] times per week [Poor Adherence] : but does not adhere to the diet [Inadequate social support] : inadequate social support [Limited decision making ability] : limited decision making ability [Walking] : walking [Low Carb Diet] : low carbohydrate [Low Salt Diet] : low salt [Insufficient Protein Foods] : insufficient in protein [PPS Score: ____] : Palliative Performance Scale (PPS) Score: [unfilled] [FAST Score: ____] : Functional Assessment Scale (FAST) Score: [unfilled] [de-identified] : Prostate cancer(2018) with s/p Radiation therapy,  brain hemorrhage(1/4/22), Craniotomy(1/5/22), early onset Alzheimer's(2018), memory loss, seizure disorder, unsteady gait, peripheral neuropathy,  headache, dizziness, [de-identified] : debility, walks only at home, unsteady gait [FreeTextEntry9] : only go outside for appointment [de-identified] : low carb, low sodium, high protein diet

## 2025-05-23 NOTE — COUNSELING
[] : diabetic screening [Not Recommended] : Aspirin use not recommended due to overall prognosis [Patient/Caregiver not ready to engage in discussion] : patient/caregiver not ready to engage in discussion [Advanced Directives discussed: ____] : Advanced directives discussed: [unfilled] [Annual Discussion and review of: ___] : Annual discussion and review of [unfilled] [Full Code] : Code Status: Full Code [No Limitations] : Treatment Guidelines: No limitations [Long Term Intubation] : Intubation: Long term intubation [Normal Weight - ( BMI  <25 )] : normal weight - ( BMI  <25 ) [Sodium restriction 2gm recommended] : sodium restriction 2 gm recommended [Non - Smoker] : non-smoker [Smoke/CO Detectors] : smoke/CO detectors [Use grab bars] : use grab bars [Use assistive device to avoid falls] : use assistive device to avoid falls [Remove clutter and unsafe carpeting to avoid falls] : remove clutter and unsafe carpeting to avoid falls [Patient not on disease-modifying anti-rheumatic drug due to overall prognosis] : Patient not on disease-modifying anti-rheumatic drug due to overall prognosis [Minimize unnecessary interventions] : minimize unnecessary interventions [Maintain functional ability] : maintain functional ability [Discussed disease trajectory with patient/caregiver] : discussed disease trajectory with patient/caregiver [_____] : HCP: [unfilled] [de-identified] : Daughter want DNR/DNi but will like to discuss with sisters

## 2025-05-23 NOTE — COUNSELING
[] : diabetic screening [Not Recommended] : Aspirin use not recommended due to overall prognosis [Patient/Caregiver not ready to engage in discussion] : patient/caregiver not ready to engage in discussion [Advanced Directives discussed: ____] : Advanced directives discussed: [unfilled] [Annual Discussion and review of: ___] : Annual discussion and review of [unfilled] [Full Code] : Code Status: Full Code [No Limitations] : Treatment Guidelines: No limitations [Long Term Intubation] : Intubation: Long term intubation [Normal Weight - ( BMI  <25 )] : normal weight - ( BMI  <25 ) [Sodium restriction 2gm recommended] : sodium restriction 2 gm recommended [Non - Smoker] : non-smoker [Smoke/CO Detectors] : smoke/CO detectors [Use grab bars] : use grab bars [Use assistive device to avoid falls] : use assistive device to avoid falls [Remove clutter and unsafe carpeting to avoid falls] : remove clutter and unsafe carpeting to avoid falls [Patient not on disease-modifying anti-rheumatic drug due to overall prognosis] : Patient not on disease-modifying anti-rheumatic drug due to overall prognosis [Minimize unnecessary interventions] : minimize unnecessary interventions [Maintain functional ability] : maintain functional ability [Discussed disease trajectory with patient/caregiver] : discussed disease trajectory with patient/caregiver [_____] : HCP: [unfilled] [de-identified] : Daughter want DNR/DNi but will like to discuss with sisters

## 2025-05-23 NOTE — REVIEW OF SYSTEMS
[de-identified] : s [Vision Problems] : vision problems [Incontinence] : incontinence [Joint Pain] : joint pain [Joint Stiffness] : joint stiffness [Back Pain] : back pain [Confusion] : confusion [Unsteady Walk] : ataxia [Memory Loss] : memory loss [Negative] : Heme/Lymph [Discharge] : no discharge [Pain] : no pain [Redness] : no redness [Dryness] : no dryness [Itching] : no itching [Abdominal Pain] : no abdominal pain [Nausea] : no nausea [Constipation] : no constipation [Diarrhea] : no diarrhea [Vomiting] : no vomiting [Heartburn] : no heartburn [Melena] : no melena [Dysuria] : no dysuria [Hesitancy] : no hesitancy [Nocturia] : no nocturia [Hematuria] : no hematuria [Frequency] : no frequency [Impotence] : no impotency [Poor Libido] : libido not poor [Muscle Pain] : no muscle pain [Muscle Weakness] : no muscle weakness [Joint Swelling] : no joint swelling [Headache] : no headache [Dizziness] : no dizziness [Fainting] : no fainting [FreeTextEntry3] : right eye poor vision  [FreeTextEntry7] : decrease  appetite [FreeTextEntry8] : stress incontinent

## 2025-05-23 NOTE — HEALTH RISK ASSESSMENT
[Independent] : feeding [Some assistance needed] : managing finances [Full assistance needed] : doing laundry [No falls in past year] : Patient reported no falls in the past year [Yes] : The patient does have visual impairment [HRA Reviewed] : Health risk assessment reviewed [TimeGetUpGo] : 6 [de-identified] : participated, unsteady gait and use walker at home